# Patient Record
Sex: MALE | Race: WHITE | NOT HISPANIC OR LATINO | Employment: UNEMPLOYED | URBAN - METROPOLITAN AREA
[De-identification: names, ages, dates, MRNs, and addresses within clinical notes are randomized per-mention and may not be internally consistent; named-entity substitution may affect disease eponyms.]

---

## 2020-05-15 ENCOUNTER — TELEPHONE (OUTPATIENT)
Dept: INTERNAL MEDICINE | Facility: CLINIC | Age: 25
End: 2020-05-15

## 2020-05-15 ENCOUNTER — OFFICE VISIT (OUTPATIENT)
Dept: INTERNAL MEDICINE | Facility: CLINIC | Age: 25
End: 2020-05-15

## 2020-05-15 VITALS
DIASTOLIC BLOOD PRESSURE: 92 MMHG | TEMPERATURE: 97.4 F | HEIGHT: 70 IN | BODY MASS INDEX: 29.78 KG/M2 | WEIGHT: 208 LBS | SYSTOLIC BLOOD PRESSURE: 152 MMHG

## 2020-05-15 DIAGNOSIS — R41.840 ATTENTION AND CONCENTRATION DEFICIT: Primary | ICD-10-CM

## 2020-05-15 DIAGNOSIS — F41.9 ANXIETY: ICD-10-CM

## 2020-05-15 DIAGNOSIS — F51.01 PRIMARY INSOMNIA: ICD-10-CM

## 2020-05-15 PROCEDURE — 99202 OFFICE O/P NEW SF 15 MIN: CPT | Performed by: PHYSICIAN ASSISTANT

## 2020-05-15 RX ORDER — ATOMOXETINE 40 MG/1
40 CAPSULE ORAL DAILY
Qty: 30 CAPSULE | Refills: 1 | Status: SHIPPED | OUTPATIENT
Start: 2020-05-15 | End: 2020-06-11

## 2020-05-15 NOTE — PATIENT INSTRUCTIONS
Insomnia  Insomnia is a sleep disorder that makes it difficult to fall asleep or stay asleep. Insomnia can cause fatigue, low energy, difficulty concentrating, mood swings, and poor performance at work or school.  There are three different ways to classify insomnia:  · Difficulty falling asleep.  · Difficulty staying asleep.  · Waking up too early in the morning.  Any type of insomnia can be long-term (chronic) or short-term (acute). Both are common. Short-term insomnia usually lasts for three months or less. Chronic insomnia occurs at least three times a week for longer than three months.  What are the causes?  Insomnia may be caused by another condition, situation, or substance, such as:  · Anxiety.  · Certain medicines.  · Gastroesophageal reflux disease (GERD) or other gastrointestinal conditions.  · Asthma or other breathing conditions.  · Restless legs syndrome, sleep apnea, or other sleep disorders.  · Chronic pain.  · Menopause.  · Stroke.  · Abuse of alcohol, tobacco, or illegal drugs.  · Mental health conditions, such as depression.  · Caffeine.  · Neurological disorders, such as Alzheimer's disease.  · An overactive thyroid (hyperthyroidism).  Sometimes, the cause of insomnia may not be known.  What increases the risk?  Risk factors for insomnia include:  · Gender. Women are affected more often than men.  · Age. Insomnia is more common as you get older.  · Stress.  · Lack of exercise.  · Irregular work schedule or working night shifts.  · Traveling between different time zones.  · Certain medical and mental health conditions.  What are the signs or symptoms?  If you have insomnia, the main symptom is having trouble falling asleep or having trouble staying asleep. This may lead to other symptoms, such as:  · Feeling fatigued or having low energy.  · Feeling nervous about going to sleep.  · Not feeling rested in the morning.  · Having trouble concentrating.  · Feeling irritable, anxious, or depressed.  How  is this diagnosed?  This condition may be diagnosed based on:  · Your symptoms and medical history. Your health care provider may ask about:  ? Your sleep habits.  ? Any medical conditions you have.  ? Your mental health.  · A physical exam.  How is this treated?  Treatment for insomnia depends on the cause. Treatment may focus on treating an underlying condition that is causing insomnia. Treatment may also include:  · Medicines to help you sleep.  · Counseling or therapy.  · Lifestyle adjustments to help you sleep better.  Follow these instructions at home:  Eating and drinking    · Limit or avoid alcohol, caffeinated beverages, and cigarettes, especially close to bedtime. These can disrupt your sleep.  · Do not eat a large meal or eat spicy foods right before bedtime. This can lead to digestive discomfort that can make it hard for you to sleep.  Sleep habits    · Keep a sleep diary to help you and your health care provider figure out what could be causing your insomnia. Write down:  ? When you sleep.  ? When you wake up during the night.  ? How well you sleep.  ? How rested you feel the next day.  ? Any side effects of medicines you are taking.  ? What you eat and drink.  · Make your bedroom a dark, comfortable place where it is easy to fall asleep.  ? Put up shades or blackout curtains to block light from outside.  ? Use a white noise machine to block noise.  ? Keep the temperature cool.  · Limit screen use before bedtime. This includes:  ? Watching TV.  ? Using your smartphone, tablet, or computer.  · Stick to a routine that includes going to bed and waking up at the same times every day and night. This can help you fall asleep faster. Consider making a quiet activity, such as reading, part of your nighttime routine.  · Try to avoid taking naps during the day so that you sleep better at night.  · Get out of bed if you are still awake after 15 minutes of trying to sleep. Keep the lights down, but try reading or  doing a quiet activity. When you feel sleepy, go back to bed.  General instructions  · Take over-the-counter and prescription medicines only as told by your health care provider.  · Exercise regularly, as told by your health care provider. Avoid exercise starting several hours before bedtime.  · Use relaxation techniques to manage stress. Ask your health care provider to suggest some techniques that may work well for you. These may include:  ? Breathing exercises.  ? Routines to release muscle tension.  ? Visualizing peaceful scenes.  · Make sure that you drive carefully. Avoid driving if you feel very sleepy.  · Keep all follow-up visits as told by your health care provider. This is important.  Contact a health care provider if:  · You are tired throughout the day.  · You have trouble in your daily routine due to sleepiness.  · You continue to have sleep problems, or your sleep problems get worse.  Get help right away if:  · You have serious thoughts about hurting yourself or someone else.  If you ever feel like you may hurt yourself or others, or have thoughts about taking your own life, get help right away. You can go to your nearest emergency department or call:  · Your local emergency services (911 in the [place]  · A suicide crisis helpline, such as the National Suicide Prevention Lifeline at 1-638.980.3368. This is open 24 hours a day.  Summary  · Insomnia is a sleep disorder that makes it difficult to fall asleep or stay asleep.  · Insomnia can be long-term (chronic) or short-term (acute).  · Treatment for insomnia depends on the cause. Treatment may focus on treating an underlying condition that is causing insomnia.  · Keep a sleep diary to help you and your health care provider figure out what could be causing your insomnia.  This information is not intended to replace advice given to you by your health care provider. Make sure you discuss any questions you have with your health care provider.  Document  Released: 12/15/2001 Document Revised: 09/27/2018 Document Reviewed: 09/27/2018  Elsevier Interactive Patient Education © 2020 [employer]

## 2020-05-15 NOTE — PROGRESS NOTES
New Patient Office Visit      Patient Name: Robert Reed    Chief Complaint:    Chief Complaint   Patient presents with   • Establish Care       History of Present Illness: Robert Reed is a 24 y.o. male  here to establish care with this provider and with this clinic.   He would like to restart ADD med he used in grade school and middle school. He was in special ed throughout school for mild learning disability and he did well in a small classroom environment so was able to quit medicine in 7th grade. He has considered getting back on medicine. Has noticed for years that he can't pay attention to tasks, he  lacks focus,and his inattention affects social and work life. Not sure who diagnosed him or gave him medications. Pt's brother and sister are diagnosed with ADD as well.  Currently he has Medicaid until July and then will lose his insurance until October when his employer insurance should kick in so trying to get caught up with long standing problem.           Subjective      Review of Systems:   Review of Systems   Constitutional: Negative for fatigue, fever and unexpected weight loss.   Gastrointestinal: Negative for abdominal pain and GERD.   Skin: Negative for rash.   Neurological: Negative for dizziness, syncope, speech difficulty and light-headedness.   Psychiatric/Behavioral: Positive for agitation, positive for hyperactivity and stress. Negative for sleep disturbance. The patient is nervous/anxious.        Past Medical History:   Past Medical History:   Diagnosis Date   • ADD (attention deficit disorder) 2003   • Hx of seasonal allergies    • Learning disabilities        Past Surgical History:   Past Surgical History:   Procedure Laterality Date   • TONSILLECTOMY Bilateral 2014       Family History: History reviewed. No pertinent family history.    Social History:   Social History     Socioeconomic History   • Marital status: Single     Spouse name: Not on file   • Number of children: 1   •  "Years of education: Not on file   • Highest education level: High school graduate   Occupational History     Employer: [employer]   Tobacco Use   • Smoking status: Current Every Day Smoker     Types: Cigarettes     Start date: 5/15/2012   • Smokeless tobacco: Current User     Types: Chew   Substance and Sexual Activity   • Alcohol use: Yes     Alcohol/week: 8.0 standard drinks     Types: 8 Cans of beer per week     Frequency: 2-3 times a week     Drinks per session: 3 or 4     Binge frequency: Never   • Drug use: Never   • Sexual activity: Defer   Social History Narrative    2yo son as of 2020. Single. Lives with his son 50% of the time.        Medications:   No current outpatient medications on file.    Allergies:   No Known Allergies    Objective     Physical Exam:  Vital Signs:   Vitals:    05/15/20 1325   BP: 152/92   BP Location: Right arm   Patient Position: Sitting   Cuff Size: Adult   Temp: 97.4 °F (36.3 °C)   TempSrc: Temporal   Weight: 94.3 kg (208 lb)   Height: 178 cm (70.08\")     Repeated  156/86    Physical Exam   Constitutional: He is oriented to person, place, and time. He appears well-developed and well-nourished. He is active.   Pt anxious, lots of psychomotor agitation   HENT:   Head: Normocephalic and atraumatic.   Eyes: Conjunctivae and EOM are normal.   Neck: Normal range of motion. Neck supple. No thyromegaly present.   Cardiovascular: Normal rate, regular rhythm, normal heart sounds and intact distal pulses.   Pulmonary/Chest: Effort normal and breath sounds normal.   Musculoskeletal: Normal range of motion. He exhibits no edema.   Lymphadenopathy:     He has no cervical adenopathy.   Neurological: He is alert and oriented to person, place, and time.   Skin: Skin is warm and dry.   Psychiatric: He has a normal mood and affect. His behavior is normal. Judgment and thought content normal.   Nursing note and vitals reviewed.      Assessment / Plan      Assessment/Plan:   Robert" was seen today for establish care.    Diagnoses and all orders for this visit:    Attention and concentration deficit  -     Ambulatory Referral to Behavioral Health    Primary insomnia  Melatonin or benadryl for insomnia.   Sleep hygiene    Anxiety  -     Ambulatory Referral to Behavioral Health       Advised to check HBP.     Follow Up:   Return if symptoms worsen or fail to improve.       [facility]      Please note that portions of this note may have been completed with a voice recognition program. Efforts were made to edit the dictations, but occasionally words are mistranscribed.

## 2020-05-15 NOTE — TELEPHONE ENCOUNTER
PT CALLED STATED THAT THE RX atomoxetine (Strattera) 40 MG capsule THAT WAS PRESCRIBED TO HIM TODAY IS $300, AND IS NEEDING A PA SNE TO INSURANCE FOR RX.    PLEASE ADVISE.  CALL BACK:[phone]     [id number] [place], [place] - [address] AT [facility] [address]

## 2020-05-18 NOTE — TELEPHONE ENCOUNTER
MIKE....... I sent in a PA via Cover my meds. They will let us know the results by fax within 5 days

## 2020-05-28 ENCOUNTER — OFFICE VISIT (OUTPATIENT)
Dept: INTERNAL MEDICINE | Facility: CLINIC | Age: 25
End: 2020-05-28

## 2020-05-28 VITALS
BODY MASS INDEX: 30.35 KG/M2 | RESPIRATION RATE: 16 BRPM | DIASTOLIC BLOOD PRESSURE: 76 MMHG | OXYGEN SATURATION: 97 % | SYSTOLIC BLOOD PRESSURE: 134 MMHG | TEMPERATURE: 98.8 F | WEIGHT: 212 LBS | HEART RATE: 72 BPM

## 2020-05-28 DIAGNOSIS — F51.01 PRIMARY INSOMNIA: ICD-10-CM

## 2020-05-28 DIAGNOSIS — R41.840 ATTENTION AND CONCENTRATION DEFICIT: ICD-10-CM

## 2020-05-28 DIAGNOSIS — B36.0 TINEA VERSICOLOR: Primary | ICD-10-CM

## 2020-05-28 PROCEDURE — 99213 OFFICE O/P EST LOW 20 MIN: CPT | Performed by: PHYSICIAN ASSISTANT

## 2020-05-28 RX ORDER — TRAZODONE HYDROCHLORIDE 50 MG/1
TABLET ORAL
Qty: 60 TABLET | Refills: 2 | Status: SHIPPED | OUTPATIENT
Start: 2020-05-28 | End: 2020-06-11

## 2020-05-28 RX ORDER — FLUCONAZOLE 150 MG/1
TABLET ORAL
Qty: 4 TABLET | Refills: 0 | Status: SHIPPED | OUTPATIENT
Start: 2020-05-28 | End: 2020-06-11

## 2020-05-28 NOTE — PATIENT INSTRUCTIONS
Insomnia  Insomnia is a sleep disorder that makes it difficult to fall asleep or stay asleep. Insomnia can cause fatigue, low energy, difficulty concentrating, mood swings, and poor performance at work or school.  There are three different ways to classify insomnia:  · Difficulty falling asleep.  · Difficulty staying asleep.  · Waking up too early in the morning.  Any type of insomnia can be long-term (chronic) or short-term (acute). Both are common. Short-term insomnia usually lasts for three months or less. Chronic insomnia occurs at least three times a week for longer than three months.  What are the causes?  Insomnia may be caused by another condition, situation, or substance, such as:  · Anxiety.  · Certain medicines.  · Gastroesophageal reflux disease (GERD) or other gastrointestinal conditions.  · Asthma or other breathing conditions.  · Restless legs syndrome, sleep apnea, or other sleep disorders.  · Chronic pain.  · Menopause.  · Stroke.  · Abuse of alcohol, tobacco, or illegal drugs.  · Mental health conditions, such as depression.  · Caffeine.  · Neurological disorders, such as Alzheimer's disease.  · An overactive thyroid (hyperthyroidism).  Sometimes, the cause of insomnia may not be known.  What increases the risk?  Risk factors for insomnia include:  · Gender. Women are affected more often than men.  · Age. Insomnia is more common as you get older.  · Stress.  · Lack of exercise.  · Irregular work schedule or working night shifts.  · Traveling between different time zones.  · Certain medical and mental health conditions.  What are the signs or symptoms?  If you have insomnia, the main symptom is having trouble falling asleep or having trouble staying asleep. This may lead to other symptoms, such as:  · Feeling fatigued or having low energy.  · Feeling nervous about going to sleep.  · Not feeling rested in the morning.  · Having trouble concentrating.  · Feeling irritable, anxious, or depressed.  How  is this diagnosed?  This condition may be diagnosed based on:  · Your symptoms and medical history. Your health care provider may ask about:  ? Your sleep habits.  ? Any medical conditions you have.  ? Your mental health.  · A physical exam.  How is this treated?  Treatment for insomnia depends on the cause. Treatment may focus on treating an underlying condition that is causing insomnia. Treatment may also include:  · Medicines to help you sleep.  · Counseling or therapy.  · Lifestyle adjustments to help you sleep better.  Follow these instructions at home:  Eating and drinking    · Limit or avoid alcohol, caffeinated beverages, and cigarettes, especially close to bedtime. These can disrupt your sleep.  · Do not eat a large meal or eat spicy foods right before bedtime. This can lead to digestive discomfort that can make it hard for you to sleep.  Sleep habits    · Keep a sleep diary to help you and your health care provider figure out what could be causing your insomnia. Write down:  ? When you sleep.  ? When you wake up during the night.  ? How well you sleep.  ? How rested you feel the next day.  ? Any side effects of medicines you are taking.  ? What you eat and drink.  · Make your bedroom a dark, comfortable place where it is easy to fall asleep.  ? Put up shades or blackout curtains to block light from outside.  ? Use a white noise machine to block noise.  ? Keep the temperature cool.  · Limit screen use before bedtime. This includes:  ? Watching TV.  ? Using your smartphone, tablet, or computer.  · Stick to a routine that includes going to bed and waking up at the same times every day and night. This can help you fall asleep faster. Consider making a quiet activity, such as reading, part of your nighttime routine.  · Try to avoid taking naps during the day so that you sleep better at night.  · Get out of bed if you are still awake after 15 minutes of trying to sleep. Keep the lights down, but try reading or  doing a quiet activity. When you feel sleepy, go back to bed.  General instructions  · Take over-the-counter and prescription medicines only as told by your health care provider.  · Exercise regularly, as told by your health care provider. Avoid exercise starting several hours before bedtime.  · Use relaxation techniques to manage stress. Ask your health care provider to suggest some techniques that may work well for you. These may include:  ? Breathing exercises.  ? Routines to release muscle tension.  ? Visualizing peaceful scenes.  · Make sure that you drive carefully. Avoid driving if you feel very sleepy.  · Keep all follow-up visits as told by your health care provider. This is important.  Contact a health care provider if:  · You are tired throughout the day.  · You have trouble in your daily routine due to sleepiness.  · You continue to have sleep problems, or your sleep problems get worse.  Get help right away if:  · You have serious thoughts about hurting yourself or someone else.  If you ever feel like you may hurt yourself or others, or have thoughts about taking your own life, get help right away. You can go to your nearest emergency department or call:  · Your local emergency services (911 in the [place]  · A suicide crisis helpline, such as the National Suicide Prevention Lifeline at 1-222.389.1933. This is open 24 hours a day.  Summary  · Insomnia is a sleep disorder that makes it difficult to fall asleep or stay asleep.  · Insomnia can be long-term (chronic) or short-term (acute).  · Treatment for insomnia depends on the cause. Treatment may focus on treating an underlying condition that is causing insomnia.  · Keep a sleep diary to help you and your health care provider figure out what could be causing your insomnia.  This information is not intended to replace advice given to you by your health care provider. Make sure you discuss any questions you have with your health care provider.  Document  Released: 12/15/2001 Document Revised: 11/30/2018 Document Reviewed: 09/27/2018  Elsevier Patient Education © 2020 Elsevier Inc.

## 2020-05-28 NOTE — PROGRESS NOTES
Follow Up Office Visit      Patient Name: Robert Reed    Chief Complaint:    Chief Complaint   Patient presents with   • Rash     ring worm       History of Present Illness: Robert Reed is a 24 y.o. male  here for  1. ADD- Hasn't gotten to see the psychiatrist yet- we had th ewrong number in our system and that was corrected today. Began strattera @ 10 days ago and has had some nausea in the mornings but still able to take.   2. Rash for about a year but no insurance till recently- tried lotrimin  otc w.  With control of the rash on his chest only.  Rash. Involves ant and post trunk.  Increasing in size and it is mildly pruritic when he becomes hot or sweaty especially with exercise.  No one in the family with similar, no exotic pets no travel internationally.  Denies new personal hygiene products.  3.  Chronic insomnia that is not well controlled with melatonin or trial of Benadryl or Unisom.  Problems since teenage years and he is positive for poor sleep hygiene per questioning today.        Subjective      Review of Systems:   Review of Systems   Constitutional: Negative for fever.   HENT: Negative for mouth sores and trouble swallowing.    Respiratory: Negative for cough.    Gastrointestinal: Negative for abdominal pain.   Genitourinary: Negative for dysuria, flank pain and hematuria.   Musculoskeletal: Negative for arthralgias, joint swelling and myalgias.   Skin: Positive for rash. Negative for dry skin.   Neurological: Negative for syncope, speech difficulty and memory problem.   Psychiatric/Behavioral: Positive for decreased concentration and sleep disturbance. Negative for hallucinations, self-injury and suicidal ideas. The patient is not nervous/anxious.        PMH, Surgical, Meds and Allergies reviewed and updated as appropriate    Allergies:   No Known Allergies    Objective     Physical Exam:  Vital Signs:   Vitals:    05/28/20 1143   BP: 134/76   BP Location: Right arm   Patient Position:  Sitting   Cuff Size: Adult   Pulse: 72   Resp: 16   Temp: 98.8 °F (37.1 °C)   TempSrc: Temporal   SpO2: 97%   Weight: 96.2 kg (212 lb)   PainSc: 0-No pain       Physical Exam   Constitutional: He appears well-developed.   HENT:   Head: Normocephalic and atraumatic.   Right Ear: External ear normal.   Left Ear: External ear normal.   Nose: Nose normal.   Eyes: Conjunctivae are normal. No scleral icterus.   Cardiovascular: Normal rate, regular rhythm and normal heart sounds.   Pulmonary/Chest: Effort normal and breath sounds normal.   Musculoskeletal: Normal range of motion. He exhibits no edema.   Skin: Skin is warm and dry. Capillary refill takes less than 2 seconds. Turgor is normal. No cyanosis. Nails show no clubbing.   There is a widespread rash scattered posterior trunk: It is slightly raised, pinkish to tan lesions that are primarily ovoid in nature with this some central clearing consistent with superficial dermatophyte.  There are a very few individual similar lesions scattered on pectoral areas bilaterally.  Otherwise skin survey is unremarkable.   Psychiatric: He has a normal mood and affect. His behavior is normal.   Nursing note and vitals reviewed.      Assessment / Plan        Assessment/Plan:   Robert was seen today for rash.    Diagnoses and all orders for this visit:    Tinea versicolor-new  Discussed options for treatment and decision made for initial oral azole and if not successful will write prescription for a topical antifungal shampoo.  Patient given written and verbal education about new diagnosis.  -     fluconazole (DIFLUCAN) 150 MG tablet; Take two tablets today and repeat in 7days    Primary insomnia-uncontrolled  -     traZODone (DESYREL) 50 MG tablet; Use 1-2 tablets 30-60 minutes prior to bedtime  Patient handout provided again for sleep hygiene as he did not stop and get his after visit summary at the last visit when we discussed this very briefly.  Patient understands importance of  changing environment as well as style changes in order to control this chronic issue.    Attention and concentration deficit  Continue current Strattera and discussed ways to decrease nausea.      Follow Up:   Return in about 2 weeks (around 6/11/2020) for ADD with [facility]      Please note that portions of this note may have been completed with a voice recognition program. Efforts were made to edit the dictations, but occasionally words are mistranscribed.

## 2020-05-29 PROBLEM — B36.0 TINEA VERSICOLOR: Status: ACTIVE | Noted: 2020-05-29

## 2020-06-11 ENCOUNTER — OFFICE VISIT (OUTPATIENT)
Dept: INTERNAL MEDICINE | Facility: CLINIC | Age: 25
End: 2020-06-11

## 2020-06-11 VITALS
HEART RATE: 75 BPM | TEMPERATURE: 97.8 F | HEIGHT: 70 IN | RESPIRATION RATE: 16 BRPM | WEIGHT: 211.6 LBS | BODY MASS INDEX: 30.29 KG/M2 | SYSTOLIC BLOOD PRESSURE: 124 MMHG | DIASTOLIC BLOOD PRESSURE: 72 MMHG | OXYGEN SATURATION: 98 %

## 2020-06-11 DIAGNOSIS — R41.840 ATTENTION AND CONCENTRATION DEFICIT: ICD-10-CM

## 2020-06-11 DIAGNOSIS — F51.01 PRIMARY INSOMNIA: ICD-10-CM

## 2020-06-11 DIAGNOSIS — B36.0 TINEA VERSICOLOR: Primary | ICD-10-CM

## 2020-06-11 DIAGNOSIS — F41.9 ANXIETY: ICD-10-CM

## 2020-06-11 PROCEDURE — 99214 OFFICE O/P EST MOD 30 MIN: CPT | Performed by: PHYSICIAN ASSISTANT

## 2020-06-11 RX ORDER — FLUCONAZOLE 150 MG/1
TABLET ORAL
Qty: 4 TABLET | Refills: 0 | Status: SHIPPED | OUTPATIENT
Start: 2020-06-11

## 2020-06-11 RX ORDER — ATOMOXETINE 80 MG/1
80 CAPSULE ORAL DAILY
Qty: 30 CAPSULE | Refills: 5 | Status: SHIPPED | OUTPATIENT
Start: 2020-06-11

## 2020-06-11 RX ORDER — TRAZODONE HYDROCHLORIDE 150 MG/1
TABLET ORAL
Qty: 30 TABLET | Refills: 3 | Status: SHIPPED | OUTPATIENT
Start: 2020-06-11

## 2020-06-11 NOTE — PROGRESS NOTES
Follow Up Office Visit      Patient Name: Robert Reed    Chief Complaint:    Chief Complaint   Patient presents with   • ADD     x2 week F/U       History of Present Illness: Robert Reed is a 24 y.o. male  here for:   1. ADD- Hasn't gotten to see the psychiatrist at prior visit bc we had the wrong number in our system and that was corrected.  Tolerating strattera well now and tried two pills at one time w/o a/e  2. Tinea Versicolor- seems better. Took fluconazole and denies pruiritis anymore. No new areas of spread, no new qualities to rash.   3.  Chronic insomnia that has improved w. Trazodone and sleep hygiene changes made so far. Delayed onset insomnia still present and takes 3 hours prior to bed now. No disrupted sleep and has gotten up to 10 hrs at time. Able to wake when he needs with his son or alarm.     4. Chronic anxiety- stems from chaotic childhood, school experiences, feels he hasn't dealt with those things and hasn't found a counselor to date.              Subjective      Review of Systems:   Review of Systems   Constitutional: Negative for fatigue, fever and unexpected weight loss.   HENT: Negative for trouble swallowing and voice change.    Eyes: Negative for visual disturbance.   Respiratory: Negative for cough and shortness of breath.    Cardiovascular: Negative for chest pain, palpitations and leg swelling.   Gastrointestinal: Negative for abdominal pain, diarrhea, vomiting and GERD.   Genitourinary: Negative for difficulty urinating and erectile dysfunction.   Skin: Positive for rash. Negative for bruise.   Neurological: Negative for headache.   Hematological: Negative for adenopathy. Does not bruise/bleed easily.   Psychiatric/Behavioral: Positive for decreased concentration. Negative for self-injury, sleep disturbance and suicidal ideas. The patient is nervous/anxious.        PMH, Surgical, Meds and Allergies reviewed and updated as appropriate    Allergies:   No Known  "Allergies    Objective     Physical Exam:  Vital Signs:   Vitals:    06/11/20 1139   BP: 124/72   BP Location: Right arm   Patient Position: Sitting   Cuff Size: Adult   Pulse: 75   Resp: 16   Temp: 97.8 °F (36.6 °C)   TempSrc: Temporal   SpO2: 98%   Weight: 96 kg (211 lb 9.6 oz)   Height: 177.8 cm (70\")   PainSc: 0-No pain       Physical Exam   Constitutional: He appears well-developed.   HENT:   Head: Normocephalic and atraumatic.   Right Ear: External ear normal.   Left Ear: External ear normal.   Nose: Nose normal.   Eyes: Conjunctivae are normal. No scleral icterus.   Cardiovascular: Normal rate, regular rhythm and normal heart sounds.   Pulmonary/Chest: Effort normal and breath sounds normal.   Musculoskeletal: Normal range of motion. He exhibits no edema.   Skin: Skin is warm and dry. Capillary refill takes less than 2 seconds. Turgor is normal. No cyanosis. Nails show no clubbing.   There is a significant improvement with >75% resolution of the prior widespread rash scattered posterior trunk: Remaining lesions are very faint and no raised border. pinkish to tan lesions that are primarily ovoid in nature with this some central clearing consistent with superficial dermatophyte.  completre clearing of pectoral.  Otherwise skin survey is unremarkable.   Psychiatric: He has a normal mood and affect. His behavior is normal.   Nursing note and vitals reviewed.      Assessment / Plan        Assessment/Plan:   Robert was seen today for add.    Diagnoses and all orders for this visit:    Tinea versicolor-Improved  -     fluconazole (DIFLUCAN) 150 MG tablet; Take two tablets today and repeat in 7days    Primary insomnia-Improved  -     traZODone (DESYREL) 150 MG tablet; Take 1/2 to 1 full tablet 30-60 minutes prior to bedtime    Attention and concentration deficit- Improved but not fully. Discussed and decision to increase Strattera dose.   -     atomoxetine (STRATTERA) 80 MG capsule; Take 1 capsule by mouth Daily.  -  "    Ambulatory Referral to Behavioral Health    Anxiety  -     Ambulatory Referral to Behavioral Health         Follow Up:   Return in about 3 months (around 9/11/2020).    Discussed options for and developed POC with pt, risks/benefits, and all questions answered. Pt would like to go outside the [facility] referral system at this time as he still has not heard from them. Printed referral made for [facility] and pt given directions and the contact sheet for them. Voices understanding and thanks. Unsure if he will be able to keep his Medical Card and if that event occurs then he should alert me so I can send in an application to have [facility] pt assistance form filled.        [facility]      Please note that portions of this note may have been completed with a voice recognition program. Efforts were made to edit the dictations, but occasionally words are mistranscribed.

## 2025-06-24 ENCOUNTER — LAB (OUTPATIENT)
Dept: INTERNAL MEDICINE | Facility: CLINIC | Age: 30
End: 2025-06-24
Payer: MEDICAID

## 2025-06-24 ENCOUNTER — OFFICE VISIT (OUTPATIENT)
Dept: INTERNAL MEDICINE | Facility: CLINIC | Age: 30
End: 2025-06-24
Payer: MEDICAID

## 2025-06-24 VITALS
SYSTOLIC BLOOD PRESSURE: 106 MMHG | HEART RATE: 76 BPM | RESPIRATION RATE: 16 BRPM | TEMPERATURE: 97.7 F | OXYGEN SATURATION: 95 % | BODY MASS INDEX: 30.92 KG/M2 | DIASTOLIC BLOOD PRESSURE: 68 MMHG | WEIGHT: 216 LBS | HEIGHT: 70 IN

## 2025-06-24 DIAGNOSIS — Z13.220 LIPID SCREENING: ICD-10-CM

## 2025-06-24 DIAGNOSIS — B35.4 RINGWORM OF BODY: Primary | ICD-10-CM

## 2025-06-24 DIAGNOSIS — Z11.59 ENCOUNTER FOR HEPATITIS C SCREENING TEST FOR LOW RISK PATIENT: ICD-10-CM

## 2025-06-24 DIAGNOSIS — B35.4 RINGWORM OF BODY: ICD-10-CM

## 2025-06-24 LAB
ALBUMIN SERPL-MCNC: 4.6 G/DL (ref 3.5–5.2)
ALBUMIN/GLOB SERPL: 1.6 G/DL
ALP SERPL-CCNC: 97 U/L (ref 39–117)
ALT SERPL W P-5'-P-CCNC: 29 U/L (ref 1–41)
ANION GAP SERPL CALCULATED.3IONS-SCNC: 15 MMOL/L (ref 5–15)
AST SERPL-CCNC: 25 U/L (ref 1–40)
BILIRUB SERPL-MCNC: 0.4 MG/DL (ref 0–1.2)
BUN SERPL-MCNC: 12 MG/DL (ref 6–20)
BUN/CREAT SERPL: 13.5 (ref 7–25)
CALCIUM SPEC-SCNC: 9.5 MG/DL (ref 8.6–10.5)
CHLORIDE SERPL-SCNC: 103 MMOL/L (ref 98–107)
CHOLEST SERPL-MCNC: 157 MG/DL (ref 0–200)
CO2 SERPL-SCNC: 23 MMOL/L (ref 22–29)
CREAT SERPL-MCNC: 0.89 MG/DL (ref 0.76–1.27)
DEPRECATED RDW RBC AUTO: 48.5 FL (ref 37–54)
EGFRCR SERPLBLD CKD-EPI 2021: 119.7 ML/MIN/1.73
ERYTHROCYTE [DISTWIDTH] IN BLOOD BY AUTOMATED COUNT: 15.7 % (ref 12.3–15.4)
GLOBULIN UR ELPH-MCNC: 2.8 GM/DL
GLUCOSE SERPL-MCNC: 91 MG/DL (ref 65–99)
HCT VFR BLD AUTO: 48.7 % (ref 37.5–51)
HDLC SERPL-MCNC: 37 MG/DL (ref 40–60)
HGB BLD-MCNC: 16.2 G/DL (ref 13–17.7)
LDLC SERPL CALC-MCNC: 106 MG/DL (ref 0–100)
LDLC/HDLC SERPL: 2.86 {RATIO}
MCH RBC QN AUTO: 28.2 PG (ref 26.6–33)
MCHC RBC AUTO-ENTMCNC: 33.3 G/DL (ref 31.5–35.7)
MCV RBC AUTO: 84.7 FL (ref 79–97)
PLATELET # BLD AUTO: 283 10*3/MM3 (ref 140–450)
PMV BLD AUTO: 9.3 FL (ref 6–12)
POTASSIUM SERPL-SCNC: 4.1 MMOL/L (ref 3.5–5.2)
PROT SERPL-MCNC: 7.4 G/DL (ref 6–8.5)
RBC # BLD AUTO: 5.75 10*6/MM3 (ref 4.14–5.8)
SODIUM SERPL-SCNC: 141 MMOL/L (ref 136–145)
TRIGL SERPL-MCNC: 70 MG/DL (ref 0–150)
VLDLC SERPL-MCNC: 14 MG/DL (ref 5–40)
WBC NRBC COR # BLD AUTO: 7.1 10*3/MM3 (ref 3.4–10.8)

## 2025-06-24 PROCEDURE — 99203 OFFICE O/P NEW LOW 30 MIN: CPT | Performed by: NURSE PRACTITIONER

## 2025-06-24 PROCEDURE — 1160F RVW MEDS BY RX/DR IN RCRD: CPT | Performed by: NURSE PRACTITIONER

## 2025-06-24 PROCEDURE — 1159F MED LIST DOCD IN RCRD: CPT | Performed by: NURSE PRACTITIONER

## 2025-06-24 PROCEDURE — 80053 COMPREHEN METABOLIC PANEL: CPT | Performed by: NURSE PRACTITIONER

## 2025-06-24 PROCEDURE — 85027 COMPLETE CBC AUTOMATED: CPT | Performed by: NURSE PRACTITIONER

## 2025-06-24 PROCEDURE — 86803 HEPATITIS C AB TEST: CPT | Performed by: NURSE PRACTITIONER

## 2025-06-24 PROCEDURE — 1126F AMNT PAIN NOTED NONE PRSNT: CPT | Performed by: NURSE PRACTITIONER

## 2025-06-24 PROCEDURE — 80061 LIPID PANEL: CPT | Performed by: NURSE PRACTITIONER

## 2025-06-24 PROCEDURE — 36415 COLL VENOUS BLD VENIPUNCTURE: CPT | Performed by: NURSE PRACTITIONER

## 2025-06-24 RX ORDER — CLONIDINE HYDROCHLORIDE 0.1 MG/1
0.2 TABLET ORAL 2 TIMES DAILY
COMMUNITY
Start: 2025-06-17

## 2025-06-24 RX ORDER — QUETIAPINE FUMARATE 100 MG/1
100 TABLET, FILM COATED ORAL
COMMUNITY
Start: 2025-06-10

## 2025-06-24 RX ORDER — CLOTRIMAZOLE AND BETAMETHASONE DIPROPIONATE 10; .64 MG/G; MG/G
1 CREAM TOPICAL 2 TIMES DAILY
Qty: 45 G | Refills: 0 | Status: SHIPPED | OUTPATIENT
Start: 2025-06-24

## 2025-06-24 NOTE — PROGRESS NOTES
Subjective   Robert Reed is a 29 y.o. male here to establish care.  Chief Complaint   Patient presents with    Establish [facility]    Skin Problem     Patient states he has what he believes is ring worm in his genital region and in his beard        History of Present Illness   Robert is a 29-year-old male who is here to establish care he is currently residing at [facility] for recovery for a history of drug abuse.  He reports he was inpatient in [place] for 30 days and now he has moved here to [place] to do outpatient 90-day program at [facility]   They have him on seroquel.   Sober 45 days now.      Has 2 kids ages 8 and 2  Family is local.   Not working while in rehab.      Concerned about rash to chest and legs. Has had ringworm in the past which was similar and required oral antifungals      The following portions of the patient's history were reviewed and updated as appropriate: allergies, current medications, past family history, past medical history, past social history, past surgical history and problem list.    Review of Systems   Constitutional:  Negative for fatigue, fever and unexpected weight loss.   Eyes:  Negative for blurred vision, double vision and visual disturbance.   Respiratory:  Negative for cough, shortness of breath and wheezing.    Cardiovascular:  Negative for chest pain, palpitations and leg swelling.   Gastrointestinal:  Negative for abdominal pain, constipation, diarrhea, nausea and vomiting.   Genitourinary:  Negative for difficulty urinating, frequency and urgency.   Musculoskeletal:  Negative for arthralgias and myalgias.   Skin:  Positive for rash. Negative for color change.   Neurological:  Negative for dizziness, weakness and headache.   Hematological:  Negative for adenopathy. Does not bruise/bleed easily.           No Known Allergies  Past Medical History:   Diagnosis Date    ADD (attention deficit disorder) 2003    Depression     Hx of seasonal allergies     Kidney stone  "    Learning disabilities      Past Surgical History:   Procedure Laterality Date    TONSILLECTOMY Bilateral 2014     Family History   Problem Relation Age of Onset    Bipolar disorder Father     ADD / ADHD Sister     Bipolar disorder Other      Social History     Socioeconomic History    Marital status: Single    Number of children: 1    Highest education level: High school graduate   Tobacco Use    Smoking status: Every Day     Current packs/day: 0.50     Average packs/day: 0.5 packs/day for 13.1 years (6.6 ttl pk-yrs)     Types: Cigarettes     Start date: 5/15/2012    Smokeless tobacco: Current     Types: Chew   Vaping Use    Vaping status: Every Day    Substances: Nicotine, Flavoring    Devices: Refillable tank   Substance and Sexual Activity    Alcohol use: Not Currently    Drug use: Not Currently     Types: Cocaine(coke), Heroin     Comment: Sober for about 45 days, currently in rehab out patient    Sexual activity: Yes     Partners: Female     Birth control/protection: I.U.D.       There is no immunization history on file for this patient.    Current Outpatient Medications:     melatonin 5 MG tablet tablet, Take 1 tablet by mouth At Night As Needed. for sleep, Disp: , Rfl:     QUEtiapine (SEROquel) 100 MG tablet, Take 1 tablet by mouth every night at bedtime., Disp: , Rfl:     cloNIDine (CATAPRES) 0.1 MG tablet, Take 2 tablets by mouth 2 (Two) Times a Day. (Patient not taking: Reported on 6/24/2025), Disp: , Rfl:     clotrimazole-betamethasone (LOTRISONE) 1-0.05 % cream, Apply 1 Application topically to the appropriate area as directed 2 (Two) Times a Day. For 2 weeks, Disp: 45 g, Rfl: 0    terbinafine (lamiSIL) 250 MG tablet, Take 1 tablet by mouth Daily for 7 days., Disp: 7 tablet, Rfl: 0    Objective   Blood pressure 106/68, pulse 76, temperature 97.7 °F (36.5 °C), temperature source Infrared, resp. rate 16, height 178.4 cm (70.25\"), weight 98 kg (216 lb), SpO2 95%.  Physical Exam  Vitals and nursing note " reviewed.   Constitutional:       Appearance: Normal appearance. He is well-developed.   HENT:      Head: Normocephalic and atraumatic.   Eyes:      Conjunctiva/sclera: Conjunctivae normal.   Cardiovascular:      Rate and Rhythm: Normal rate and regular rhythm.      Heart sounds: Normal heart sounds.   Pulmonary:      Effort: Pulmonary effort is normal. No respiratory distress.      Breath sounds: Normal breath sounds.   Abdominal:      General: Bowel sounds are normal. There is no distension.      Palpations: Abdomen is soft.      Tenderness: There is no abdominal tenderness.   Musculoskeletal:      Cervical back: Normal range of motion.   Skin:     General: Skin is warm and dry.      Findings: Rash present. Rash is macular.      Comments: brown macular rash to chest and legs with central clearing multiple annular lesions that have coalesced     Neurological:      Mental Status: He is alert and oriented to person, place, and time.   Psychiatric:         Speech: Speech normal.         Behavior: Behavior normal.         Thought Content: Thought content normal.         Judgment: Judgment normal.         Assessment & Plan   Diagnoses and all orders for this visit:    1. Ringworm of body (Primary)  -     CBC (No Diff); Future  -     Comprehensive Metabolic Panel; Future  -     clotrimazole-betamethasone (LOTRISONE) 1-0.05 % cream; Apply 1 Application topically to the appropriate area as directed 2 (Two) Times a Day. For 2 weeks  Dispense: 45 g; Refill: 0    2. Lipid screening  -     Lipid Panel; Future    3. Encounter for hepatitis C screening test for low risk patient  -     HCV Antibody Rfx To Qnt PCR; Future    Rash consistent with fungal infection.  Will go ahead and treat with clotrimazole betamethasone cream.  Check LFTs.  If rash not clearing with above, consider terbinafine oral.  Discussed risks to liver.  He prefers to go ahead and do the oral antifungal.  Await LFT results               Return in about 2  months (around 8/24/2025) for Annual, and need to collect labs today.  Plan of care discussed with pt. They verbalized understanding and agreement.     Dictated Utilizing Dragon Dictation   Please note that portions of this note were completed with a voice recognition program.   Part of this note may be an electronic transcription/translation of spoken language to printed text using the Dragon Dictation System.

## 2025-06-26 LAB
HCV AB SERPL QL IA: NON REACTIVE
HCV AB SERPL QL IA: NORMAL

## 2025-06-29 ENCOUNTER — RESULTS FOLLOW-UP (OUTPATIENT)
Dept: INTERNAL MEDICINE | Facility: CLINIC | Age: 30
End: 2025-06-29
Payer: MEDICAID

## 2025-07-02 DIAGNOSIS — B35.4 RINGWORM OF BODY: Primary | ICD-10-CM

## 2025-07-02 RX ORDER — TERBINAFINE HYDROCHLORIDE 250 MG/1
250 TABLET ORAL DAILY
Qty: 7 TABLET | Refills: 0 | Status: SHIPPED | OUTPATIENT
Start: 2025-07-02 | End: 2025-07-09

## 2025-07-28 ENCOUNTER — LAB (OUTPATIENT)
Dept: INTERNAL MEDICINE | Facility: CLINIC | Age: 30
End: 2025-07-28
Payer: MEDICAID

## 2025-07-28 ENCOUNTER — OFFICE VISIT (OUTPATIENT)
Dept: INTERNAL MEDICINE | Facility: CLINIC | Age: 30
End: 2025-07-28
Payer: MEDICAID

## 2025-07-28 VITALS
WEIGHT: 225 LBS | SYSTOLIC BLOOD PRESSURE: 118 MMHG | TEMPERATURE: 98.4 F | BODY MASS INDEX: 32.21 KG/M2 | HEIGHT: 70 IN | DIASTOLIC BLOOD PRESSURE: 84 MMHG | OXYGEN SATURATION: 97 % | HEART RATE: 94 BPM

## 2025-07-28 DIAGNOSIS — Z11.3 SCREEN FOR STD (SEXUALLY TRANSMITTED DISEASE): ICD-10-CM

## 2025-07-28 DIAGNOSIS — L30.4 INTERTRIGO: ICD-10-CM

## 2025-07-28 DIAGNOSIS — Z00.00 ANNUAL PHYSICAL EXAM: Primary | ICD-10-CM

## 2025-07-28 DIAGNOSIS — Z87.898 HISTORY OF SUBSTANCE USE: ICD-10-CM

## 2025-07-28 PROCEDURE — 87661 TRICHOMONAS VAGINALIS AMPLIF: CPT | Performed by: STUDENT IN AN ORGANIZED HEALTH CARE EDUCATION/TRAINING PROGRAM

## 2025-07-28 PROCEDURE — 86592 SYPHILIS TEST NON-TREP QUAL: CPT | Performed by: STUDENT IN AN ORGANIZED HEALTH CARE EDUCATION/TRAINING PROGRAM

## 2025-07-28 PROCEDURE — 36415 COLL VENOUS BLD VENIPUNCTURE: CPT | Performed by: STUDENT IN AN ORGANIZED HEALTH CARE EDUCATION/TRAINING PROGRAM

## 2025-07-28 PROCEDURE — 87491 CHLMYD TRACH DNA AMP PROBE: CPT | Performed by: STUDENT IN AN ORGANIZED HEALTH CARE EDUCATION/TRAINING PROGRAM

## 2025-07-28 PROCEDURE — G0432 EIA HIV-1/HIV-2 SCREEN: HCPCS | Performed by: STUDENT IN AN ORGANIZED HEALTH CARE EDUCATION/TRAINING PROGRAM

## 2025-07-28 PROCEDURE — 1126F AMNT PAIN NOTED NONE PRSNT: CPT | Performed by: STUDENT IN AN ORGANIZED HEALTH CARE EDUCATION/TRAINING PROGRAM

## 2025-07-28 PROCEDURE — 87591 N.GONORRHOEAE DNA AMP PROB: CPT | Performed by: STUDENT IN AN ORGANIZED HEALTH CARE EDUCATION/TRAINING PROGRAM

## 2025-07-28 PROCEDURE — 2014F MENTAL STATUS ASSESS: CPT | Performed by: STUDENT IN AN ORGANIZED HEALTH CARE EDUCATION/TRAINING PROGRAM

## 2025-07-28 PROCEDURE — 99395 PREV VISIT EST AGE 18-39: CPT | Performed by: STUDENT IN AN ORGANIZED HEALTH CARE EDUCATION/TRAINING PROGRAM

## 2025-07-28 RX ORDER — CARIPRAZINE 1.5 MG/1
CAPSULE, GELATIN COATED ORAL
COMMUNITY
Start: 2025-07-18

## 2025-07-28 RX ORDER — CLOTRIMAZOLE 1 %
1 CREAM (GRAM) TOPICAL 2 TIMES DAILY
Qty: 28 G | Refills: 2 | Status: SHIPPED | OUTPATIENT
Start: 2025-07-28

## 2025-07-28 RX ORDER — MIRTAZAPINE 15 MG/1
TABLET, FILM COATED ORAL
COMMUNITY
Start: 2025-07-21

## 2025-07-28 NOTE — ASSESSMENT & PLAN NOTE
Itching and redness in bilateral axilla consistent with intertrigo. Discussed keeping area clean, dry. RX clotrimazole. He as also recently prexibed anti-fungal/steroid combo which he can use given component of itching.

## 2025-07-28 NOTE — PROGRESS NOTES
Office Note     Name: Robert Reed    : 1995     MRN: [id number]     Chief Complaint  Annual Exam (No concerns or issues)    Subjective     History of Present Illness:  Robert Reed is a 30 y.o. male who presents today for annual physical.       He is in rehab currently. He is currently at [facility]. He was on Seroquel but they switched to Vraylar 1.5 mg. This has only been for about 1 week. Its gotten a bit better.  Now he is doing an IOP. Previously substances - heroine, cocaine and meth. He has been sober about 60 days. Longest stretch of sobriety.   He owns a house here. He was in rehab in [place].   He takes mirtazapine for sleep, which does not help.   He uses clonidine as needed for anxiety. That works well. It doesn't make him too tired.   No history of hepatitis C. No history of IVDU.      Exercise - gyms; 2 hours a day. Breaks on the weekend.   He has to children. 8 and 3 yo.   Nutrition - decent. Microwave meals. Pimento cheese sandwich , easy snacks and meals.     Mother has diabetes.   No hx HLD.   Desires STD testing.     He has been getting super red armpits. They are itchy.   He had ring worm. He was prescribed pills. He had a small patch on the abdomen.       Review of Systems:   Review of Systems    Past Medical History:   Past Medical History:   Diagnosis Date    ADD (attention deficit disorder) 2003    Depression     Hx of seasonal allergies     Kidney stone     Learning disabilities        Past Surgical History:   Past Surgical History:   Procedure Laterality Date    TONSILLECTOMY Bilateral        Family History:   Family History   Problem Relation Age of Onset    Bipolar disorder Father     ADD / ADHD Sister     Bipolar disorder Other        Social History:   Social History     Socioeconomic History    Marital status: Single    Number of children: 1    Highest education level: High school graduate   Tobacco Use    Smoking status: Every Day     Current packs/day:  "0.50     Average packs/day: 0.5 packs/day for 13.2 years (6.6 ttl pk-yrs)     Types: Cigarettes     Start date: 5/15/2012    Smokeless tobacco: Current     Types: Chew   Vaping Use    Vaping status: Every Day    Substances: Nicotine, Flavoring    Devices: Refillable tank   Substance and Sexual Activity    Alcohol use: Not Currently    Drug use: Not Currently     Types: Cocaine(coke), Heroin     Comment: Sober for about 45 days, currently in rehab out patient    Sexual activity: Yes     Partners: Female     Birth control/protection: I.U.D.       Immunizations:   There is no immunization history on file for this patient.     Medications:     Current Outpatient Medications:     cloNIDine (CATAPRES) 0.1 MG tablet, Take 2 tablets by mouth 2 (Two) Times a Day., Disp: , Rfl:     melatonin 5 MG tablet tablet, Take 1 tablet by mouth At Night As Needed. for sleep, Disp: , Rfl:     mirtazapine (REMERON) 15 MG tablet, , Disp: , Rfl:     Vraylar 1.5 MG capsule capsule, , Disp: , Rfl:     clotrimazole (LOTRIMIN) 1 % cream, Apply 1 Application topically to the appropriate area as directed 2 (Two) Times a Day., Disp: 28 g, Rfl: 2    clotrimazole-betamethasone (LOTRISONE) 1-0.05 % cream, Apply 1 Application topically to the appropriate area as directed 2 (Two) Times a Day. For 2 weeks (Patient not taking: Reported on 7/28/2025), Disp: 45 g, Rfl: 0    QUEtiapine (SEROquel) 100 MG tablet, Take 1 tablet by mouth every night at bedtime. (Patient not taking: Reported on 7/28/2025), Disp: , Rfl:     Allergies:   No Known Allergies    Objective     Vital Signs  /84   Pulse 94   Temp 98.4 °F (36.9 °C) (Infrared)   Ht 178.4 cm (70.24\")   Wt 102 kg (225 lb)   SpO2 97%   BMI 32.07 kg/m²   Estimated body mass index is 32.07 kg/m² as calculated from the following:    Height as of this encounter: 178.4 cm (70.24\").    Weight as of this encounter: 102 kg (225 lb).           Physical Exam  Constitutional:       Appearance: Normal " appearance.   HENT:      Head: Normocephalic and atraumatic.      Nose: Nose normal.   Eyes:      Conjunctiva/sclera: Conjunctivae normal.      Pupils: Pupils are equal, round, and reactive to light.   Cardiovascular:      Rate and Rhythm: Normal rate and regular rhythm.   Pulmonary:      Effort: Pulmonary effort is normal.   Abdominal:      General: Abdomen is flat.   Skin:     Comments: Erythematous patches in bilateral axilla with central clearning. No satellite lesions.    Neurological:      General: No focal deficit present.      Mental Status: He is alert and oriented to person, place, and time.   Psychiatric:         Mood and Affect: Mood normal.         Behavior: Behavior normal.         Thought Content: Thought content normal.        Procedures     Results:  No results found for this or any previous visit (from the past 24 hours).     Assessment and Plan     Assessment/Plan:  Diagnoses and all orders for this visit:    1. Annual physical exam (Primary)    2. Screen for STD (sexually transmitted disease)  -     Cancel: Chlamydia trachomatis, Neisseria gonorrhoeae, PCR w/ confirmation - Swab, Urine, Clean Catch  -     HIV-1/O/2 Ag/Ab w Reflex; Future  -     RPR; Future  -     Chlamydia trachomatis, Neisseria gonorrhoeae, Trichomonas vaginalis, PCR - , Urine, Clean Catch    3. Intertrigo    Other orders  -     clotrimazole (LOTRIMIN) 1 % cream; Apply 1 Application topically to the appropriate area as directed 2 (Two) Times a Day.  Dispense: 28 g; Refill: 2      Assessment & Plan  Annual physical exam  We reviewed past medical history, social history, family history and addressed chief complaints. Reviewed medications- they are all prescribed by [facility]. Encouraged regular physical activity and eating healthy meals and snacks. He exercises about 2 hours per day. He eats a lot of frozen meals. His LDL is mildly elevated. Discussed mental health and sleep hygiene. Reviewed previous labs, imaging and  office visits notes with primary care and specialist.   Screen for STD (sexually transmitted disease)  Discussed and patient is agreeable to urine and blood testing.   Intertrigo  Itching and redness in bilateral axilla consistent with intertrigo. Discussed keeping area clean, dry. RX clotrimazole. He as also recently prexibed anti-fungal/steroid combo which he can use given component of itching.   History of substance use  Currently in therapy at [facility]. Meds are managed by them. He is doing well currently, though sleep is poor.      Follow Up  No follow-ups on file.    Aidee Farrell MD   [facility]

## 2025-07-28 NOTE — ASSESSMENT & PLAN NOTE
We reviewed past medical history, social history, family history and addressed chief complaints. Reviewed medications- [facility] Encouraged regular physical activity and eating healthy meals and snacks. He exercises about 2 hours per day. He eats a lot of frozen meals. His LDL is mildly elevated. Discussed mental health and sleep hygiene. Reviewed previous labs, imaging and office visits notes with primary care and specialist.

## 2025-07-29 LAB
HIV 1+2 AB+HIV1 P24 AG SERPL QL IA: NORMAL
RPR SER QL: NORMAL

## 2025-07-31 LAB
C TRACH RRNA SPEC QL NAA+PROBE: NEGATIVE
N GONORRHOEA RRNA SPEC QL NAA+PROBE: NEGATIVE
T VAGINALIS RRNA SPEC QL NAA+PROBE: NEGATIVE

## 2025-08-06 ENCOUNTER — PATIENT ROUNDING (BHMG ONLY) (OUTPATIENT)
Dept: URGENT CARE | Facility: CLINIC | Age: 30
End: 2025-08-06
Payer: MEDICAID

## 2025-08-22 ENCOUNTER — OFFICE VISIT (OUTPATIENT)
Dept: INTERNAL MEDICINE | Facility: CLINIC | Age: 30
End: 2025-08-22
Payer: MEDICAID

## 2025-08-22 VITALS
HEIGHT: 72 IN | TEMPERATURE: 96.4 F | DIASTOLIC BLOOD PRESSURE: 60 MMHG | RESPIRATION RATE: 14 BRPM | BODY MASS INDEX: 31.75 KG/M2 | HEART RATE: 73 BPM | WEIGHT: 234.4 LBS | OXYGEN SATURATION: 97 % | SYSTOLIC BLOOD PRESSURE: 110 MMHG

## 2025-08-22 DIAGNOSIS — B35.0 TINEA BARBAE: Primary | ICD-10-CM

## 2025-08-22 RX ORDER — MIRTAZAPINE 7.5 MG/1
7.5 TABLET, FILM COATED ORAL NIGHTLY
COMMUNITY
Start: 2025-08-05

## 2025-08-22 RX ORDER — FLUCONAZOLE 150 MG/1
300 TABLET ORAL WEEKLY
Qty: 8 TABLET | Refills: 0 | Status: SHIPPED | OUTPATIENT
Start: 2025-08-22